# Patient Record
Sex: FEMALE | Employment: FULL TIME | URBAN - METROPOLITAN AREA
[De-identification: names, ages, dates, MRNs, and addresses within clinical notes are randomized per-mention and may not be internally consistent; named-entity substitution may affect disease eponyms.]

---

## 2024-07-08 ENCOUNTER — OFFICE VISIT (OUTPATIENT)
Dept: GYNECOLOGY | Age: 24
End: 2024-07-08
Payer: COMMERCIAL

## 2024-07-08 VITALS
OXYGEN SATURATION: 99 % | WEIGHT: 129 LBS | RESPIRATION RATE: 17 BRPM | BODY MASS INDEX: 20.73 KG/M2 | SYSTOLIC BLOOD PRESSURE: 112 MMHG | HEART RATE: 92 BPM | HEIGHT: 66 IN | DIASTOLIC BLOOD PRESSURE: 76 MMHG

## 2024-07-08 DIAGNOSIS — N94.6 DYSMENORRHEA: ICD-10-CM

## 2024-07-08 DIAGNOSIS — N63.14 MASS OF LOWER INNER QUADRANT OF RIGHT BREAST: ICD-10-CM

## 2024-07-08 DIAGNOSIS — Z01.419 WELL WOMAN EXAM WITH ROUTINE GYNECOLOGICAL EXAM: Primary | ICD-10-CM

## 2024-07-08 PROCEDURE — 99385 PREV VISIT NEW AGE 18-39: CPT | Performed by: OBSTETRICS & GYNECOLOGY

## 2024-07-08 ASSESSMENT — ENCOUNTER SYMPTOMS
RESPIRATORY NEGATIVE: 1
ALLERGIC/IMMUNOLOGIC NEGATIVE: 1
EYES NEGATIVE: 1
GASTROINTESTINAL NEGATIVE: 1

## 2024-07-09 NOTE — PROGRESS NOTES
Subjective   Patient ID: Shiloh Blevins is a 24 y.o. female.    Patient is here for annual. Patient with some pain in her right breast. Patient with some dysmenorrhea.         Review of Systems   Constitutional: Negative.    HENT: Negative.     Eyes: Negative.    Respiratory: Negative.     Cardiovascular: Negative.    Gastrointestinal: Negative.    Endocrine: Negative.    Genitourinary:  Positive for menstrual problem and pelvic pain.   Musculoskeletal: Negative.    Skin: Negative.    Allergic/Immunologic: Negative.    Neurological: Negative.    Hematological: Negative.    Psychiatric/Behavioral: Negative.        Date of Birth 2000  Past Medical History:   Diagnosis Date    Dysmenorrhea     Irregular uterine bleeding      Past Surgical History:   Procedure Laterality Date    SEPTOPLASTY      WISDOM TOOTH EXTRACTION       OB History    Para Term  AB Living   0 0 0 0 0 0   SAB IAB Ectopic Molar Multiple Live Births   0 0 0 0 0 0     Social History     Socioeconomic History    Marital status: Single     Spouse name: Not on file    Number of children: Not on file    Years of education: Not on file    Highest education level: Not on file   Occupational History    Not on file   Tobacco Use    Smoking status: Former     Types: Cigarettes    Smokeless tobacco: Former   Substance and Sexual Activity    Alcohol use: Yes    Drug use: Not on file    Sexual activity: Yes     Partners: Male   Other Topics Concern    Not on file   Social History Narrative    Not on file     Social Determinants of Health     Financial Resource Strain: Not on file   Food Insecurity: Not on file   Transportation Needs: Not on file   Physical Activity: Not on file   Stress: Not on file   Social Connections: Not on file   Intimate Partner Violence: Not on file   Housing Stability: Not on file     No Known Allergies  No outpatient medications have been marked as taking for the 24 encounter (Office Visit) with Nona

## 2024-07-11 LAB
C TRACH DNA CVX QL NAA+PROBE: NEGATIVE
N GONORRHOEA DNA SPEC QL NAA+PROBE: NEGATIVE

## 2024-08-06 ENCOUNTER — HOSPITAL ENCOUNTER (OUTPATIENT)
Dept: ULTRASOUND IMAGING | Age: 24
Discharge: HOME | End: 2024-08-06
Attending: OBSTETRICS & GYNECOLOGY
Payer: COMMERCIAL

## 2024-08-06 DIAGNOSIS — N63.14 MASS OF LOWER INNER QUADRANT OF RIGHT BREAST: ICD-10-CM

## 2024-08-06 DIAGNOSIS — N94.6 DYSMENORRHEA: ICD-10-CM

## 2024-08-06 PROCEDURE — 76642 ULTRASOUND BREAST LIMITED: CPT

## 2024-08-06 PROCEDURE — 76856 US EXAM PELVIC COMPLETE: CPT

## 2024-08-15 ENCOUNTER — TELEPHONE (OUTPATIENT)
Dept: GYNECOLOGY | Age: 24
End: 2024-08-15

## 2024-08-15 DIAGNOSIS — R92.8 ABNORMAL FINDING ON BREAST IMAGING: Primary | ICD-10-CM

## 2024-09-10 ENCOUNTER — TELEPHONE (OUTPATIENT)
Dept: SURGERY | Age: 24
End: 2024-09-10

## 2024-09-13 ENCOUNTER — TELEPHONE (OUTPATIENT)
Dept: SURGERY | Age: 24
End: 2024-09-13

## 2024-09-13 ENCOUNTER — OFFICE VISIT (OUTPATIENT)
Dept: SURGERY | Age: 24
End: 2024-09-13
Payer: COMMERCIAL

## 2024-09-13 VITALS
HEIGHT: 66 IN | WEIGHT: 123 LBS | SYSTOLIC BLOOD PRESSURE: 84 MMHG | BODY MASS INDEX: 19.77 KG/M2 | HEART RATE: 61 BPM | DIASTOLIC BLOOD PRESSURE: 58 MMHG

## 2024-09-13 DIAGNOSIS — N63.41 SUBAREOLAR MASS OF RIGHT BREAST: Primary | ICD-10-CM

## 2024-09-13 PROCEDURE — 99244 OFF/OP CNSLTJ NEW/EST MOD 40: CPT | Performed by: SURGERY

## 2024-10-07 SDOH — HEALTH STABILITY: PHYSICAL HEALTH: ON AVERAGE, HOW MANY MINUTES DO YOU ENGAGE IN EXERCISE AT THIS LEVEL?: 150+ MIN

## 2024-10-07 SDOH — HEALTH STABILITY: PHYSICAL HEALTH: ON AVERAGE, HOW MANY DAYS PER WEEK DO YOU ENGAGE IN MODERATE TO STRENUOUS EXERCISE (LIKE A BRISK WALK)?: 5 DAYS

## 2024-10-10 ENCOUNTER — OFFICE VISIT (OUTPATIENT)
Dept: INTERNAL MEDICINE CLINIC | Age: 24
End: 2024-10-10

## 2024-10-10 VITALS
HEIGHT: 66 IN | SYSTOLIC BLOOD PRESSURE: 118 MMHG | DIASTOLIC BLOOD PRESSURE: 64 MMHG | WEIGHT: 127 LBS | BODY MASS INDEX: 20.41 KG/M2

## 2024-10-10 DIAGNOSIS — Z00.00 ADULT WELLNESS VISIT: Primary | ICD-10-CM

## 2024-10-10 DIAGNOSIS — F32.A ANXIETY AND DEPRESSION: ICD-10-CM

## 2024-10-10 DIAGNOSIS — F41.9 ANXIETY AND DEPRESSION: ICD-10-CM

## 2024-10-10 RX ORDER — SERTRALINE HYDROCHLORIDE 100 MG/1
100 TABLET, FILM COATED ORAL DAILY
Qty: 90 TABLET | Refills: 3 | Status: SHIPPED | OUTPATIENT
Start: 2024-10-10 | End: 2025-10-05

## 2024-10-10 SDOH — ECONOMIC STABILITY: FOOD INSECURITY: WITHIN THE PAST 12 MONTHS, THE FOOD YOU BOUGHT JUST DIDN'T LAST AND YOU DIDN'T HAVE MONEY TO GET MORE.: NEVER TRUE

## 2024-10-10 SDOH — ECONOMIC STABILITY: FOOD INSECURITY: WITHIN THE PAST 12 MONTHS, YOU WORRIED THAT YOUR FOOD WOULD RUN OUT BEFORE YOU GOT MONEY TO BUY MORE.: NEVER TRUE

## 2024-10-10 SDOH — ECONOMIC STABILITY: INCOME INSECURITY: HOW HARD IS IT FOR YOU TO PAY FOR THE VERY BASICS LIKE FOOD, HOUSING, MEDICAL CARE, AND HEATING?: NOT HARD AT ALL

## 2024-10-10 ASSESSMENT — PATIENT HEALTH QUESTIONNAIRE - PHQ9
SUM OF ALL RESPONSES TO PHQ QUESTIONS 1-9: 0
SUM OF ALL RESPONSES TO PHQ9 QUESTIONS 1 & 2: 0
1. LITTLE INTEREST OR PLEASURE IN DOING THINGS: NOT AT ALL
2. FEELING DOWN, DEPRESSED OR HOPELESS: NOT AT ALL
SUM OF ALL RESPONSES TO PHQ QUESTIONS 1-9: 0

## 2024-10-10 NOTE — ASSESSMENT & PLAN NOTE
- discussed healthy diet and exercise  - discussed relevant vaccinations  - routine labs ordered  - declines STD screening  - up to date with dentist appts  - recommend eye doctor appt for checkup  - up to date with PAPs

## 2024-10-10 NOTE — PROGRESS NOTES
10/10/2024    Shiloh Blevins (:  2000) is a 24 y.o. female, here for evaluation of the following medical concerns:    Chief Complaint   Patient presents with    Establish Care        HPI    Patient is here to establish care. No prior PCP.     She has previously been to psychiatry. She was diagnosed with possible ADHD at that time? She never treated this in the past. She feels this is mostly a social issue but it plays in when she feels a little more down. She feels this is manageable.    She tries to eat healthy diet. 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    No SOB, CP, N/V.       Prior to Visit Medications    Medication Sig Taking? Authorizing Provider   sertraline (ZOLOFT) 100 MG tablet Take 1 tablet by mouth daily Yes Monique Brizuela MD        No Known Allergies    Past Medical History:   Diagnosis Date    Dysmenorrhea     Irregular uterine bleeding        Past Surgical History:   Procedure Laterality Date    SEPTOPLASTY      WISDOM TOOTH EXTRACTION         Social History     Socioeconomic History    Marital status: Single     Spouse name: Not on file    Number of children: Not on file    Years of education: Not on file    Highest education level: Not on file   Occupational History    Not on file   Tobacco Use    Smoking status: Former     Types: Cigarettes    Smokeless tobacco: Former   Substance and Sexual Activity    Alcohol use: Yes    Drug use: Not on file    Sexual activity: Yes     Partners: Male   Other Topics Concern    Not on file   Social History Narrative    Not on file     Social Determinants of Health     Financial Resource Strain: Low Risk  (10/10/2024)    Overall Financial Resource Strain (CARDIA)     Difficulty of Paying Living Expenses: Not hard at all   Food Insecurity: No Food Insecurity (10/10/2024)    Hunger Vital Sign     Worried About Running Out of Food in the Last Year: Never true     Ran Out of Food in the Last Year: Never true   Transportation Needs: Unknown

## 2024-10-10 NOTE — ASSESSMENT & PLAN NOTE
This problem is not controlled on current regimen. This was previously managed by telehealth provider. Dose was increased from 50 to 75mg a day ~2 mo ago. Not in therapy.   - recommend CBT  - increase zoloft from 75 to 100 mg daily

## 2025-03-10 ENCOUNTER — HOSPITAL ENCOUNTER (OUTPATIENT)
Dept: ULTRASOUND IMAGING | Age: 25
Discharge: HOME | End: 2025-03-10
Attending: SURGERY
Payer: COMMERCIAL

## 2025-03-10 DIAGNOSIS — N63.41 SUBAREOLAR MASS OF RIGHT BREAST: ICD-10-CM

## 2025-03-10 PROCEDURE — 76642 ULTRASOUND BREAST LIMITED: CPT

## 2025-03-14 ENCOUNTER — OFFICE VISIT (OUTPATIENT)
Dept: SURGERY | Age: 25
End: 2025-03-14
Payer: COMMERCIAL

## 2025-03-14 VITALS
OXYGEN SATURATION: 100 % | RESPIRATION RATE: 18 BRPM | HEIGHT: 66 IN | BODY MASS INDEX: 21.24 KG/M2 | HEART RATE: 85 BPM | WEIGHT: 132.2 LBS

## 2025-03-14 DIAGNOSIS — N63.41 SUBAREOLAR MASS OF RIGHT BREAST: Primary | ICD-10-CM

## 2025-03-14 PROCEDURE — 99214 OFFICE O/P EST MOD 30 MIN: CPT | Performed by: NURSE PRACTITIONER

## 2025-03-14 NOTE — PROGRESS NOTES
[facility]  Surgical Breast Oncology     Primary Care Provider: Monique Brizuela MD    CC: Breast Mass, Right     HPI: Ms. Shiloh Blevins is a 24 y.o. woman who presents today for follow up right breast mass, present for 5 years, fluctuates in size with menstrual cycle, not tender or bothersome, not really changing in size or shape over the 5 years.      She states that she does perform routine self breast evaluations and has not noticed any new abnormalities such as masses, skin changes, color changes,nipple discharge, or changes to the nipple-areolar complex.      Never required a breast biopsy.   No family history of breast or ovarian cancer.  She talked with Dr. Muhammad at her last visit about surgical excision vs surveillance, at that time she opted for surveillance but now thinks she would like it removed so she doesn't have to have a biopsy or further follow ups (fearful of needles).        Right breast U/S 3/10/2025:  Targeted ultrasound of the right breast at 5:00, retroareolar, demonstrates the  oval hypoechoic mass with gently lobulated margins measuring 2.4 x 2.1 x 1.4 cm,  previously 1.9 x 1.8 x 1.3 cm.  IMPRESSION:  Increasing size of retroareolar right breast mass. Given the increasing size it  is mildly suspicious and biopsy is recommended.  ACR BI-RADS Category: 4 (suspicious)  RECOMMENDATIONS: Ultrasound-guided biopsy is recommended. If biopsy is not  performed then a follow-up ultrasound in 6 months is recommended.      Breast History: Abn imaging   History of Previous Breast Biopsy:  Self Breast Exams Completed:  Family History of Breast Cancer: none  Family History of Other Cancers: none  [employer] Decent: none  Bra Size:  A cup  BABS Risk Assessment _17.0____ %     Gyne History:  : 0  Para: 0  Age of Menarche: 11  Age of Menopause: n/a  Age of first live Birth:  n/a  History of Hysterectomy / SERA-BSO: n/a  History of OCP's/HRT: yes, 4 years  Family History or personal

## 2025-03-23 SDOH — ECONOMIC STABILITY: FOOD INSECURITY: WITHIN THE PAST 12 MONTHS, YOU WORRIED THAT YOUR FOOD WOULD RUN OUT BEFORE YOU GOT MONEY TO BUY MORE.: NEVER TRUE

## 2025-03-23 SDOH — ECONOMIC STABILITY: FOOD INSECURITY: WITHIN THE PAST 12 MONTHS, THE FOOD YOU BOUGHT JUST DIDN'T LAST AND YOU DIDN'T HAVE MONEY TO GET MORE.: NEVER TRUE

## 2025-03-23 SDOH — ECONOMIC STABILITY: TRANSPORTATION INSECURITY
IN THE PAST 12 MONTHS, HAS LACK OF TRANSPORTATION KEPT YOU FROM MEETINGS, WORK, OR FROM GETTING THINGS NEEDED FOR DAILY LIVING?: NO

## 2025-03-23 SDOH — ECONOMIC STABILITY: INCOME INSECURITY: IN THE LAST 12 MONTHS, WAS THERE A TIME WHEN YOU WERE NOT ABLE TO PAY THE MORTGAGE OR RENT ON TIME?: NO

## 2025-03-23 SDOH — ECONOMIC STABILITY: TRANSPORTATION INSECURITY
IN THE PAST 12 MONTHS, HAS THE LACK OF TRANSPORTATION KEPT YOU FROM MEDICAL APPOINTMENTS OR FROM GETTING MEDICATIONS?: NO

## 2025-03-23 ASSESSMENT — PATIENT HEALTH QUESTIONNAIRE - PHQ9
5. POOR APPETITE OR OVEREATING: SEVERAL DAYS
8. MOVING OR SPEAKING SO SLOWLY THAT OTHER PEOPLE COULD HAVE NOTICED. OR THE OPPOSITE, BEING SO FIGETY OR RESTLESS THAT YOU HAVE BEEN MOVING AROUND A LOT MORE THAN USUAL: NOT AT ALL
7. TROUBLE CONCENTRATING ON THINGS, SUCH AS READING THE NEWSPAPER OR WATCHING TELEVISION: SEVERAL DAYS
2. FEELING DOWN, DEPRESSED OR HOPELESS: SEVERAL DAYS
9. THOUGHTS THAT YOU WOULD BE BETTER OFF DEAD, OR OF HURTING YOURSELF: NOT AT ALL
3. TROUBLE FALLING OR STAYING ASLEEP: NOT AT ALL
SUM OF ALL RESPONSES TO PHQ QUESTIONS 1-9: 5
SUM OF ALL RESPONSES TO PHQ QUESTIONS 1-9: 5
6. FEELING BAD ABOUT YOURSELF - OR THAT YOU ARE A FAILURE OR HAVE LET YOURSELF OR YOUR FAMILY DOWN: SEVERAL DAYS
SUM OF ALL RESPONSES TO PHQ QUESTIONS 1-9: 5
SUM OF ALL RESPONSES TO PHQ QUESTIONS 1-9: 5
2. FEELING DOWN, DEPRESSED OR HOPELESS: SEVERAL DAYS
5. POOR APPETITE OR OVEREATING: SEVERAL DAYS
4. FEELING TIRED OR HAVING LITTLE ENERGY: SEVERAL DAYS
6. FEELING BAD ABOUT YOURSELF - OR THAT YOU ARE A FAILURE OR HAVE LET YOURSELF OR YOUR FAMILY DOWN: SEVERAL DAYS
SUM OF ALL RESPONSES TO PHQ QUESTIONS 1-9: 5
1. LITTLE INTEREST OR PLEASURE IN DOING THINGS: NOT AT ALL
4. FEELING TIRED OR HAVING LITTLE ENERGY: SEVERAL DAYS
10. IF YOU CHECKED OFF ANY PROBLEMS, HOW DIFFICULT HAVE THESE PROBLEMS MADE IT FOR YOU TO DO YOUR WORK, TAKE CARE OF THINGS AT HOME, OR GET ALONG WITH OTHER PEOPLE: SOMEWHAT DIFFICULT
10. IF YOU CHECKED OFF ANY PROBLEMS, HOW DIFFICULT HAVE THESE PROBLEMS MADE IT FOR YOU TO DO YOUR WORK, TAKE CARE OF THINGS AT HOME, OR GET ALONG WITH OTHER PEOPLE: SOMEWHAT DIFFICULT
1. LITTLE INTEREST OR PLEASURE IN DOING THINGS: NOT AT ALL
3. TROUBLE FALLING OR STAYING ASLEEP: NOT AT ALL
8. MOVING OR SPEAKING SO SLOWLY THAT OTHER PEOPLE COULD HAVE NOTICED. OR THE OPPOSITE - BEING SO FIDGETY OR RESTLESS THAT YOU HAVE BEEN MOVING AROUND A LOT MORE THAN USUAL: NOT AT ALL
9. THOUGHTS THAT YOU WOULD BE BETTER OFF DEAD, OR OF HURTING YOURSELF: NOT AT ALL
7. TROUBLE CONCENTRATING ON THINGS, SUCH AS READING THE NEWSPAPER OR WATCHING TELEVISION: SEVERAL DAYS

## 2025-03-26 ENCOUNTER — OFFICE VISIT (OUTPATIENT)
Dept: INTERNAL MEDICINE CLINIC | Age: 25
End: 2025-03-26
Payer: COMMERCIAL

## 2025-03-26 VITALS
OXYGEN SATURATION: 100 % | SYSTOLIC BLOOD PRESSURE: 96 MMHG | TEMPERATURE: 98.4 F | BODY MASS INDEX: 20.98 KG/M2 | DIASTOLIC BLOOD PRESSURE: 62 MMHG | HEART RATE: 81 BPM | WEIGHT: 130 LBS

## 2025-03-26 DIAGNOSIS — N63.10 MASS OF RIGHT BREAST, UNSPECIFIED QUADRANT: ICD-10-CM

## 2025-03-26 DIAGNOSIS — F41.9 ANXIETY AND DEPRESSION: ICD-10-CM

## 2025-03-26 DIAGNOSIS — F90.9 ATTENTION DEFICIT HYPERACTIVITY DISORDER (ADHD), UNSPECIFIED ADHD TYPE: Primary | ICD-10-CM

## 2025-03-26 DIAGNOSIS — F32.A ANXIETY AND DEPRESSION: ICD-10-CM

## 2025-03-26 PROCEDURE — 99214 OFFICE O/P EST MOD 30 MIN: CPT | Performed by: STUDENT IN AN ORGANIZED HEALTH CARE EDUCATION/TRAINING PROGRAM

## 2025-03-26 PROCEDURE — G2211 COMPLEX E/M VISIT ADD ON: HCPCS | Performed by: STUDENT IN AN ORGANIZED HEALTH CARE EDUCATION/TRAINING PROGRAM

## 2025-03-26 NOTE — PROGRESS NOTES
3/26/2025    Shiloh Blevins (:  2000) is a 24 y.o. female, here for evaluation of the following medical concerns:    Chief Complaint   Patient presents with    ADHD     Hoping to get meds for ADHD diagnosis. Info has been sent through my chart.         HPI    Patient was diagnosed with ADHD in college. She feels that ADHD has impacted her socially a lot. She graduated last Spring and has been applying to jobs but has been struggling with focusing on this task.        Prior to Visit Medications    Medication Sig Taking? Authorizing Provider   Multiple Vitamin (MULTIVITAMIN ADULT PO) Take 1 capsule by mouth daily Yes ProviderRachelle MD   Omega-3 Fatty Acids (OMEGA-3 FISH OIL PO) Take 1 capsule by mouth daily Yes ProviderRachelle MD        No Known Allergies    Past Medical History:   Diagnosis Date    Dysmenorrhea     Irregular uterine bleeding     Mass of right breast 3/26/2025       Past Surgical History:   Procedure Laterality Date    SEPTOPLASTY      WISDOM TOOTH EXTRACTION         Social History     Socioeconomic History    Marital status: Single     Spouse name: Not on file    Number of children: Not on file    Years of education: Not on file    Highest education level: Not on file   Occupational History    Not on file   Tobacco Use    Smoking status: Former     Types: Cigarettes    Smokeless tobacco: Former   Substance and Sexual Activity    Alcohol use: Yes    Drug use: Not on file    Sexual activity: Yes     Partners: Male   Other Topics Concern    Not on file   Social History Narrative    Not on file     Social Drivers of Health     Financial Resource Strain: Low Risk  (10/10/2024)    Overall Financial Resource Strain (CARDIA)     Difficulty of Paying Living Expenses: Not hard at all   Food Insecurity: No Food Insecurity (3/23/2025)    Hunger Vital Sign     Worried About Running Out of Food in the Last Year: Never true     Ran Out of Food in the Last Year: Never true

## 2025-03-26 NOTE — PATIENT INSTRUCTIONS
[facility]  Address: [address] [place] [address] [address], [place] [address]  Phone: [phone]    CBT- cognitive behavioral therapy

## 2025-04-01 ENCOUNTER — OFFICE VISIT (OUTPATIENT)
Dept: INTERNAL MEDICINE CLINIC | Age: 25
End: 2025-04-01
Payer: COMMERCIAL

## 2025-04-01 VITALS
TEMPERATURE: 98.3 F | SYSTOLIC BLOOD PRESSURE: 120 MMHG | HEIGHT: 65 IN | HEART RATE: 98 BPM | DIASTOLIC BLOOD PRESSURE: 76 MMHG | WEIGHT: 125 LBS | BODY MASS INDEX: 20.83 KG/M2 | OXYGEN SATURATION: 98 %

## 2025-04-01 DIAGNOSIS — N63.10 MASS OF RIGHT BREAST, UNSPECIFIED QUADRANT: ICD-10-CM

## 2025-04-01 DIAGNOSIS — Z01.818 PREOP EXAMINATION: Primary | ICD-10-CM

## 2025-04-01 PROBLEM — Z00.00 ADULT WELLNESS VISIT: Status: RESOLVED | Noted: 2024-10-10 | Resolved: 2025-04-01

## 2025-04-01 PROCEDURE — 99213 OFFICE O/P EST LOW 20 MIN: CPT | Performed by: STUDENT IN AN ORGANIZED HEALTH CARE EDUCATION/TRAINING PROGRAM

## 2025-04-01 PROCEDURE — G2211 COMPLEX E/M VISIT ADD ON: HCPCS | Performed by: STUDENT IN AN ORGANIZED HEALTH CARE EDUCATION/TRAINING PROGRAM

## 2025-04-01 RX ORDER — CETIRIZINE HYDROCHLORIDE 10 MG/1
10 TABLET ORAL DAILY
COMMUNITY

## 2025-04-01 ASSESSMENT — ENCOUNTER SYMPTOMS
SHORTNESS OF BREATH: 0
VOMITING: 0
SORE THROAT: 0
NAUSEA: 0

## 2025-04-01 NOTE — PROGRESS NOTES
2025    Shiloh Blevins (:  2000) is a 24 y.o. female, here for preop exam:      Planned Surgical Procedure: RIGHT BREAST EXCISIONAL BIOPSY under MAC     Date of Planned Surgery: 2025     Location of Surgery: [facility] OR      Attending Surgeon: Dr. Grace Muhammad MD      HPI:  Patient is presenting for preop evaluation. She feels well today and denies any recent infectious symptoms or acute complaints. She is physically active in general and exercises regularly. She tolerates exertion well without excessive dyspnea or any chest pressure, light headedness or palpitations.       Exercise tolerance > /= 4 METS (or less if applicable)    Prior Anesthesia Complications: No     History of Abnormal Bleeding:  No               Review of Systems   Constitutional:  Negative for chills and fever.   HENT:  Negative for sore throat.    Respiratory:  Negative for shortness of breath.    Cardiovascular:  Negative for chest pain.   Gastrointestinal:  Negative for nausea and vomiting.       Past Medical History:   Diagnosis Date    Dysmenorrhea     Irregular uterine bleeding     Mass of right breast 3/26/2025       Past Surgical History:   Procedure Laterality Date    SEPTOPLASTY      WISDOM TOOTH EXTRACTION         Prior to Visit Medications    Medication Sig Taking? Authorizing Provider   cetirizine (ZYRTEC) 10 MG tablet Take 1 tablet by mouth daily Yes ProviderRachelle MD   Multiple Vitamin (MULTIVITAMIN ADULT PO) Take 1 capsule by mouth daily Yes Rachelle Day MD   Omega-3 Fatty Acids (OMEGA-3 FISH OIL PO) Take 1 capsule by mouth daily Yes ProviderRachelle MD        Social History     Socioeconomic History    Marital status: Single     Spouse name: Not on file    Number of children: Not on file    Years of education: Not on file    Highest education level: Not on file   Occupational History    Not on file   Tobacco Use    Smoking status: Former     Types: Cigarettes    Smokeless

## 2025-04-01 NOTE — H&P (VIEW-ONLY)
2025    Shiloh Blevins (:  2000) is a 24 y.o. female, here for preop exam:      Planned Surgical Procedure: RIGHT BREAST EXCISIONAL BIOPSY under MAC     Date of Planned Surgery: 2025     Location of Surgery: [facility] OR      Attending Surgeon: Dr. Grace Muhammad MD      HPI:  Patient is presenting for preop evaluation. She feels well today and denies any recent infectious symptoms or acute complaints. She is physically active in general and exercises regularly. She tolerates exertion well without excessive dyspnea or any chest pressure, light headedness or palpitations.       Exercise tolerance > /= 4 METS (or less if applicable)    Prior Anesthesia Complications: No     History of Abnormal Bleeding:  No               Review of Systems   Constitutional:  Negative for chills and fever.   HENT:  Negative for sore throat.    Respiratory:  Negative for shortness of breath.    Cardiovascular:  Negative for chest pain.   Gastrointestinal:  Negative for nausea and vomiting.       Past Medical History:   Diagnosis Date    Dysmenorrhea     Irregular uterine bleeding     Mass of right breast 3/26/2025       Past Surgical History:   Procedure Laterality Date    SEPTOPLASTY      WISDOM TOOTH EXTRACTION         Prior to Visit Medications    Medication Sig Taking? Authorizing Provider   cetirizine (ZYRTEC) 10 MG tablet Take 1 tablet by mouth daily Yes ProviderRachelle MD   Multiple Vitamin (MULTIVITAMIN ADULT PO) Take 1 capsule by mouth daily Yes Rachelle Day MD   Omega-3 Fatty Acids (OMEGA-3 FISH OIL PO) Take 1 capsule by mouth daily Yes ProviderRachelle MD        Social History     Socioeconomic History    Marital status: Single     Spouse name: Not on file    Number of children: Not on file    Years of education: Not on file    Highest education level: Not on file   Occupational History    Not on file   Tobacco Use    Smoking status: Former     Types: Cigarettes    Smokeless  Appearance: Normal appearance. She is normal weight.   HENT:      Head: Normocephalic.      Mouth/Throat:      Mouth: Mucous membranes are moist.   Eyes:      General: No scleral icterus.     Extraocular Movements: Extraocular movements intact.      Conjunctiva/sclera: Conjunctivae normal.   Cardiovascular:      Rate and Rhythm: Normal rate and regular rhythm.      Pulses: Normal pulses.      Heart sounds: Normal heart sounds.   Pulmonary:      Effort: Pulmonary effort is normal.      Breath sounds: Normal breath sounds.   Abdominal:      General: There is no distension.      Palpations: Abdomen is soft.      Tenderness: There is no abdominal tenderness.   Musculoskeletal:         General: No swelling or tenderness.      Cervical back: Neck supple. No tenderness.   Lymphadenopathy:      Cervical: No cervical adenopathy.   Skin:     General: Skin is warm and dry.   Neurological:      Mental Status: She is oriented to person, place, and time. Mental status is at baseline.      Motor: No weakness.   Psychiatric:         Mood and Affect: Mood normal.       Studies: Labs - pending     Assessment of Surgical Risk:  Risk of Planned Surgical Procedure: Low    Active High Risk Cardiac Conditions:  none   Risk Calculation: RCRI Score (0 out of 6) - Class I Risk  Cardiac Risk Assessment: Low   OVERALL risk for surgery: Low      This patient is medically optimized at this point in time for this procedure pending normal labs.    ======================  PRE-OP MEDICATION INSTRUCTIONS:    VITAMINS AND SUPPLEMENTS: Hold for 1 week prior to procedure.     ====================================  Mass of right breast   Has lumpectomy scheduled for April 11

## 2025-04-07 DIAGNOSIS — Z01.818 PREOP EXAMINATION: ICD-10-CM

## 2025-04-07 LAB
APTT BLD: 36.6 SEC (ref 22.1–36.4)
INR PPP: 1.11 (ref 0.85–1.15)
PROTHROMBIN TIME: 14.5 SEC (ref 11.9–14.9)

## 2025-04-08 ENCOUNTER — RESULTS FOLLOW-UP (OUTPATIENT)
Dept: INTERNAL MEDICINE CLINIC | Age: 25
End: 2025-04-08

## 2025-04-08 LAB
ANION GAP SERPL CALCULATED.3IONS-SCNC: 10 MMOL/L (ref 3–16)
BUN SERPL-MCNC: 6 MG/DL (ref 7–20)
CALCIUM SERPL-MCNC: 9.1 MG/DL (ref 8.3–10.6)
CHLORIDE SERPL-SCNC: 106 MMOL/L (ref 99–110)
CO2 SERPL-SCNC: 24 MMOL/L (ref 21–32)
CREAT SERPL-MCNC: 0.6 MG/DL (ref 0.6–1.1)
DEPRECATED RDW RBC AUTO: 12.7 % (ref 12.4–15.4)
GFR SERPLBLD CREATININE-BSD FMLA CKD-EPI: >90 ML/MIN/{1.73_M2}
GLUCOSE SERPL-MCNC: 87 MG/DL (ref 70–99)
HCT VFR BLD AUTO: 40.3 % (ref 36–48)
HGB BLD-MCNC: 13.7 G/DL (ref 12–16)
MCH RBC QN AUTO: 28.9 PG (ref 26–34)
MCHC RBC AUTO-ENTMCNC: 34.1 G/DL (ref 31–36)
MCV RBC AUTO: 84.6 FL (ref 80–100)
PLATELET # BLD AUTO: 310 K/UL (ref 135–450)
PMV BLD AUTO: 9.1 FL (ref 5–10.5)
POTASSIUM SERPL-SCNC: 4.1 MMOL/L (ref 3.5–5.1)
RBC # BLD AUTO: 4.76 M/UL (ref 4–5.2)
SODIUM SERPL-SCNC: 140 MMOL/L (ref 136–145)
WBC # BLD AUTO: 4.9 K/UL (ref 4–11)

## 2025-04-08 RX ORDER — ESCITALOPRAM OXALATE 5 MG/1
5 TABLET ORAL DAILY
COMMUNITY
Start: 2025-04-02

## 2025-04-08 NOTE — PROGRESS NOTES

## 2025-04-10 NOTE — PROGRESS NOTES
4/10/25 @ 1215 I called surgeon office and left message on Jenny voice mail that new antibiotic needed  as pt weight is only 125 lb /NR

## 2025-04-11 ENCOUNTER — APPOINTMENT (OUTPATIENT)
Dept: NUCLEAR MEDICINE | Age: 25
End: 2025-04-11
Attending: SURGERY
Payer: COMMERCIAL

## 2025-04-11 ENCOUNTER — HOSPITAL ENCOUNTER (OUTPATIENT)
Age: 25
Setting detail: OUTPATIENT SURGERY
Discharge: HOME | End: 2025-04-11
Attending: SURGERY | Admitting: SURGERY
Payer: COMMERCIAL

## 2025-04-11 ENCOUNTER — ANESTHESIA EVENT (OUTPATIENT)
Dept: OPERATING ROOM | Age: 25
End: 2025-04-11
Payer: COMMERCIAL

## 2025-04-11 ENCOUNTER — ANESTHESIA (OUTPATIENT)
Dept: OPERATING ROOM | Age: 25
End: 2025-04-11
Payer: COMMERCIAL

## 2025-04-11 VITALS
OXYGEN SATURATION: 100 % | SYSTOLIC BLOOD PRESSURE: 102 MMHG | HEART RATE: 51 BPM | RESPIRATION RATE: 16 BRPM | BODY MASS INDEX: 20.83 KG/M2 | DIASTOLIC BLOOD PRESSURE: 63 MMHG | HEIGHT: 66 IN | WEIGHT: 129.6 LBS | TEMPERATURE: 97.4 F

## 2025-04-11 DIAGNOSIS — N63.10 MASS OF RIGHT BREAST, UNSPECIFIED QUADRANT: ICD-10-CM

## 2025-04-11 DIAGNOSIS — N63.41 SUBAREOLAR MASS OF RIGHT BREAST: Primary | ICD-10-CM

## 2025-04-11 LAB — HCG UR QL: NEGATIVE

## 2025-04-11 PROCEDURE — 88305 TISSUE EXAM BY PATHOLOGIST: CPT

## 2025-04-11 PROCEDURE — 3700000001 HC ADD 15 MINUTES (ANESTHESIA): Performed by: SURGERY

## 2025-04-11 PROCEDURE — 2580000003 HC RX 258: Performed by: ANESTHESIOLOGY

## 2025-04-11 PROCEDURE — 2500000003 HC RX 250 WO HCPCS: Performed by: SURGERY

## 2025-04-11 PROCEDURE — 6360000002 HC RX W HCPCS

## 2025-04-11 PROCEDURE — 3600000004 HC SURGERY LEVEL 4 BASE: Performed by: SURGERY

## 2025-04-11 PROCEDURE — 6370000000 HC RX 637 (ALT 250 FOR IP): Performed by: SURGERY

## 2025-04-11 PROCEDURE — 2580000003 HC RX 258

## 2025-04-11 PROCEDURE — 7100000010 HC PHASE II RECOVERY - FIRST 15 MIN: Performed by: SURGERY

## 2025-04-11 PROCEDURE — 7100000011 HC PHASE II RECOVERY - ADDTL 15 MIN: Performed by: SURGERY

## 2025-04-11 PROCEDURE — 7100000000 HC PACU RECOVERY - FIRST 15 MIN: Performed by: SURGERY

## 2025-04-11 PROCEDURE — 2500000003 HC RX 250 WO HCPCS

## 2025-04-11 PROCEDURE — 2709999900 HC NON-CHARGEABLE SUPPLY: Performed by: SURGERY

## 2025-04-11 PROCEDURE — 3700000000 HC ANESTHESIA ATTENDED CARE: Performed by: SURGERY

## 2025-04-11 PROCEDURE — 6360000002 HC RX W HCPCS: Performed by: SURGERY

## 2025-04-11 PROCEDURE — 3600000014 HC SURGERY LEVEL 4 ADDTL 15MIN: Performed by: SURGERY

## 2025-04-11 PROCEDURE — 84703 CHORIONIC GONADOTROPIN ASSAY: CPT

## 2025-04-11 PROCEDURE — 7100000001 HC PACU RECOVERY - ADDTL 15 MIN: Performed by: SURGERY

## 2025-04-11 RX ORDER — IPRATROPIUM BROMIDE AND ALBUTEROL SULFATE 2.5; .5 MG/3ML; MG/3ML
1 SOLUTION RESPIRATORY (INHALATION)
Status: CANCELLED | OUTPATIENT
Start: 2025-04-11

## 2025-04-11 RX ORDER — FENTANYL CITRATE 50 UG/ML
25 INJECTION, SOLUTION INTRAMUSCULAR; INTRAVENOUS EVERY 5 MIN PRN
Refills: 0 | Status: CANCELLED | OUTPATIENT
Start: 2025-04-11

## 2025-04-11 RX ORDER — LABETALOL HYDROCHLORIDE 5 MG/ML
10 INJECTION, SOLUTION INTRAVENOUS
Status: CANCELLED | OUTPATIENT
Start: 2025-04-11

## 2025-04-11 RX ORDER — HYDROMORPHONE HYDROCHLORIDE 1 MG/ML
0.5 INJECTION, SOLUTION INTRAMUSCULAR; INTRAVENOUS; SUBCUTANEOUS EVERY 5 MIN PRN
Refills: 0 | Status: CANCELLED | OUTPATIENT
Start: 2025-04-11

## 2025-04-11 RX ORDER — PROCHLORPERAZINE EDISYLATE 5 MG/ML
5 INJECTION INTRAMUSCULAR; INTRAVENOUS
Status: CANCELLED | OUTPATIENT
Start: 2025-04-11

## 2025-04-11 RX ORDER — SODIUM CHLORIDE 9 MG/ML
INJECTION, SOLUTION INTRAVENOUS PRN
Status: CANCELLED | OUTPATIENT
Start: 2025-04-11

## 2025-04-11 RX ORDER — SODIUM CHLORIDE, SODIUM LACTATE, POTASSIUM CHLORIDE, CALCIUM CHLORIDE 600; 310; 30; 20 MG/100ML; MG/100ML; MG/100ML; MG/100ML
INJECTION, SOLUTION INTRAVENOUS CONTINUOUS
Status: DISCONTINUED | OUTPATIENT
Start: 2025-04-11 | End: 2025-04-11 | Stop reason: HOSPADM

## 2025-04-11 RX ORDER — SODIUM CHLORIDE 0.9 % (FLUSH) 0.9 %
5-40 SYRINGE (ML) INJECTION PRN
Status: CANCELLED | OUTPATIENT
Start: 2025-04-11

## 2025-04-11 RX ORDER — KETOROLAC TROMETHAMINE 30 MG/ML
INJECTION, SOLUTION INTRAMUSCULAR; INTRAVENOUS
Status: DISCONTINUED | OUTPATIENT
Start: 2025-04-11 | End: 2025-04-11 | Stop reason: SDUPTHER

## 2025-04-11 RX ORDER — MIDAZOLAM HYDROCHLORIDE 1 MG/ML
INJECTION, SOLUTION INTRAMUSCULAR; INTRAVENOUS
Status: DISCONTINUED | OUTPATIENT
Start: 2025-04-11 | End: 2025-04-11 | Stop reason: SDUPTHER

## 2025-04-11 RX ORDER — SODIUM CHLORIDE 0.9 % (FLUSH) 0.9 %
5-40 SYRINGE (ML) INJECTION EVERY 12 HOURS SCHEDULED
Status: CANCELLED | OUTPATIENT
Start: 2025-04-11

## 2025-04-11 RX ORDER — DIPHENHYDRAMINE HYDROCHLORIDE 50 MG/ML
12.5 INJECTION, SOLUTION INTRAMUSCULAR; INTRAVENOUS
Status: CANCELLED | OUTPATIENT
Start: 2025-04-11

## 2025-04-11 RX ORDER — MEPERIDINE HYDROCHLORIDE 25 MG/ML
12.5 INJECTION INTRAMUSCULAR; INTRAVENOUS; SUBCUTANEOUS EVERY 5 MIN PRN
Refills: 0 | Status: CANCELLED | OUTPATIENT
Start: 2025-04-11

## 2025-04-11 RX ORDER — LIDOCAINE HYDROCHLORIDE 20 MG/ML
INJECTION, SOLUTION EPIDURAL; INFILTRATION; INTRACAUDAL; PERINEURAL
Status: DISCONTINUED | OUTPATIENT
Start: 2025-04-11 | End: 2025-04-11 | Stop reason: SDUPTHER

## 2025-04-11 RX ORDER — OXYCODONE HYDROCHLORIDE 5 MG/1
5 TABLET ORAL
Refills: 0 | Status: CANCELLED | OUTPATIENT
Start: 2025-04-11

## 2025-04-11 RX ORDER — PROPOFOL 10 MG/ML
INJECTION, EMULSION INTRAVENOUS
Status: DISCONTINUED | OUTPATIENT
Start: 2025-04-11 | End: 2025-04-11 | Stop reason: SDUPTHER

## 2025-04-11 RX ORDER — OXYCODONE AND ACETAMINOPHEN 5; 325 MG/1; MG/1
1 TABLET ORAL EVERY 6 HOURS PRN
Qty: 5 TABLET | Refills: 0 | Status: SHIPPED | OUTPATIENT
Start: 2025-04-11 | End: 2025-04-14

## 2025-04-11 RX ORDER — FENTANYL CITRATE 50 UG/ML
INJECTION, SOLUTION INTRAMUSCULAR; INTRAVENOUS
Status: DISCONTINUED | OUTPATIENT
Start: 2025-04-11 | End: 2025-04-11 | Stop reason: SDUPTHER

## 2025-04-11 RX ORDER — MAGNESIUM HYDROXIDE 1200 MG/15ML
LIQUID ORAL CONTINUOUS PRN
Status: COMPLETED | OUTPATIENT
Start: 2025-04-11 | End: 2025-04-11

## 2025-04-11 RX ORDER — LORAZEPAM 2 MG/ML
0.5 INJECTION INTRAMUSCULAR
Status: CANCELLED | OUTPATIENT
Start: 2025-04-11

## 2025-04-11 RX ORDER — ACETAMINOPHEN 325 MG/1
650 TABLET ORAL ONCE
Status: COMPLETED | OUTPATIENT
Start: 2025-04-11 | End: 2025-04-11

## 2025-04-11 RX ORDER — NALOXONE HYDROCHLORIDE 0.4 MG/ML
INJECTION, SOLUTION INTRAMUSCULAR; INTRAVENOUS; SUBCUTANEOUS PRN
Status: CANCELLED | OUTPATIENT
Start: 2025-04-11

## 2025-04-11 RX ORDER — ONDANSETRON 2 MG/ML
INJECTION INTRAMUSCULAR; INTRAVENOUS
Status: DISCONTINUED | OUTPATIENT
Start: 2025-04-11 | End: 2025-04-11 | Stop reason: SDUPTHER

## 2025-04-11 RX ORDER — ONDANSETRON 2 MG/ML
4 INJECTION INTRAMUSCULAR; INTRAVENOUS
Status: CANCELLED | OUTPATIENT
Start: 2025-04-11

## 2025-04-11 RX ORDER — BUPIVACAINE HYDROCHLORIDE 5 MG/ML
INJECTION, SOLUTION EPIDURAL; INTRACAUDAL; PERINEURAL PRN
Status: DISCONTINUED | OUTPATIENT
Start: 2025-04-11 | End: 2025-04-11 | Stop reason: ALTCHOICE

## 2025-04-11 RX ADMIN — SODIUM CHLORIDE, SODIUM LACTATE, POTASSIUM CHLORIDE, AND CALCIUM CHLORIDE: .6; .31; .03; .02 INJECTION, SOLUTION INTRAVENOUS at 09:14

## 2025-04-11 RX ADMIN — PROPOFOL 50 MG: 10 INJECTION, EMULSION INTRAVENOUS at 09:42

## 2025-04-11 RX ADMIN — LIDOCAINE HYDROCHLORIDE 80 MG: 20 INJECTION, SOLUTION EPIDURAL; INFILTRATION; INTRACAUDAL; PERINEURAL at 09:41

## 2025-04-11 RX ADMIN — FENTANYL CITRATE 25 MCG: 50 INJECTION, SOLUTION INTRAMUSCULAR; INTRAVENOUS at 09:36

## 2025-04-11 RX ADMIN — WATER 1000 MG: 1 INJECTION INTRAMUSCULAR; INTRAVENOUS; SUBCUTANEOUS at 09:46

## 2025-04-11 RX ADMIN — KETOROLAC TROMETHAMINE 30 MG: 30 INJECTION, SOLUTION INTRAMUSCULAR at 10:07

## 2025-04-11 RX ADMIN — FENTANYL CITRATE 25 MCG: 50 INJECTION, SOLUTION INTRAMUSCULAR; INTRAVENOUS at 09:49

## 2025-04-11 RX ADMIN — ACETAMINOPHEN 650 MG: 325 TABLET ORAL at 08:55

## 2025-04-11 RX ADMIN — ONDANSETRON 4 MG: 2 INJECTION INTRAMUSCULAR; INTRAVENOUS at 09:31

## 2025-04-11 RX ADMIN — MIDAZOLAM HYDROCHLORIDE 2 MG: 1 INJECTION, SOLUTION INTRAMUSCULAR; INTRAVENOUS at 09:31

## 2025-04-11 RX ADMIN — PROPOFOL 100 MCG/KG/MIN: 10 INJECTION, EMULSION INTRAVENOUS at 09:41

## 2025-04-11 RX ADMIN — DEXMEDETOMIDINE HYDROCHLORIDE 4 MCG: 100 INJECTION, SOLUTION INTRAVENOUS at 09:31

## 2025-04-11 ASSESSMENT — PAIN - FUNCTIONAL ASSESSMENT
PAIN_FUNCTIONAL_ASSESSMENT: NONE - DENIES PAIN
PAIN_FUNCTIONAL_ASSESSMENT: 0-10
PAIN_FUNCTIONAL_ASSESSMENT: 0-10

## 2025-04-11 NOTE — ANESTHESIA PRE PROCEDURE
Counseling given: Not Answered      Vital Signs (Current):   Vitals:    04/11/25 0816   BP: (!) 126/90   Pulse: 73   Resp: 14   Temp: 97.9 °F (36.6 °C)   TempSrc: Oral   SpO2: 100%   Weight: 58.8 kg (129 lb 9.6 oz)   Height: 1.676 m (5' 6\")                                              BP Readings from Last 3 Encounters:   04/11/25 (!) 126/90   04/01/25 120/76   03/26/25 96/62       NPO Status:                                                                                 BMI:   Wt Readings from Last 3 Encounters:   04/11/25 58.8 kg (129 lb 9.6 oz)   04/01/25 56.7 kg (125 lb)   03/26/25 59 kg (130 lb)     Body mass index is 20.92 kg/m².    CBC:   Lab Results   Component Value Date/Time    WBC 4.9 04/07/2025 12:09 PM    RBC 4.76 04/07/2025 12:09 PM    HGB 13.7 04/07/2025 12:09 PM    HCT 40.3 04/07/2025 12:09 PM    MCV 84.6 04/07/2025 12:09 PM    RDW 12.7 04/07/2025 12:09 PM     04/07/2025 12:09 PM       CMP:   Lab Results   Component Value Date/Time     04/07/2025 12:09 PM    K 4.1 04/07/2025 12:09 PM     04/07/2025 12:09 PM    CO2 24 04/07/2025 12:09 PM    BUN 6 04/07/2025 12:09 PM    CREATININE 0.6 04/07/2025 12:09 PM    LABGLOM >90 04/07/2025 12:09 PM    GLUCOSE 87 04/07/2025 12:09 PM    CALCIUM 9.1 04/07/2025 12:09 PM       POC Tests: No results for input(s): \"POCGLU\", \"POCNA\", \"POCK\", \"POCCL\", \"POCBUN\", \"POCHEMO\", \"POCHCT\" in the last 72 hours.    Coags:   Lab Results   Component Value Date/Time    PROTIME 14.5 04/07/2025 12:09 PM    INR 1.11 04/07/2025 12:09 PM    APTT 36.6 04/07/2025 12:09 PM       HCG (If Applicable): No results found for: \"PREGTESTUR\", \"PREGSERUM\", \"HCG\", \"HCGQUANT\"     ABGs: No results found for: \"PHART\", \"PO2ART\", \"UHQ4ZAR\", \"DGM9SJQ\", \"BEART\", \"V3XVXDKZ\"     Type & Screen (If Applicable):  No results found for: \"ABORH\", \"LABANTI\"    Drug/Infectious Status (If Applicable):  No results found for: \"HIV\", \"HEPCAB\"    COVID-19 Screening (If Applicable): No results found for:

## 2025-04-11 NOTE — PROGRESS NOTES
Vitals:    04/11/25 1110   BP:    Pulse: 61   Resp: 16   Temp: 97.8 °F (36.6 °C)   SpO2: 100%         Intake/Output Summary (Last 24 hours) at 4/11/2025 1115  Last data filed at 4/11/2025 1019  Gross per 24 hour   Intake 600 ml   Output --   Net 600 ml       Pain assessment:  none  Pain Level: 0    Patient transferred to care of [facility] RN. Taken to [facility] per Luann, pacu transport. Awake, denies pain/nausea. Taking po. Right breast drsg d/I. Support bra on. All belongings w/pt.    4/11/2025 11:15 AM

## 2025-04-11 NOTE — ANESTHESIA POSTPROCEDURE EVALUATION
Department of Anesthesiology  Postprocedure Note    Patient: Shiloh Blevins  MRN: [id number]  YOB: 2000  Date of evaluation: 4/11/2025    Procedure Summary       Date: 04/11/25 Room / Location: [place] [id number] / [facility]    Anesthesia Start: 0935 Anesthesia Stop: 1028    Procedure: RIGHT BREAST EXCISIONAL BIOPSY (Right: Breast) Diagnosis:       Mass of right breast, unspecified quadrant      (Mass of right breast, unspecified quadrant [N63.10])    Surgeons: Grace Muhammad MD Responsible Provider: Edmond Cordero MD    Anesthesia Type: MAC ASA Status: 1            Anesthesia Type: MAC    Jersey Phase I: Jersey Score: 10    Jersey Phase II:      Anesthesia Post Evaluation    Patient location during evaluation: PACU  Patient participation: complete - patient participated  Level of consciousness: awake  Airway patency: patent  Nausea & Vomiting: no nausea and no vomiting  Cardiovascular status: blood pressure returned to baseline and hemodynamically stable  Respiratory status: acceptable  Hydration status: euvolemic  Multimodal analgesia pain management approach  Pain management: adequate    No notable events documented.

## 2025-04-11 NOTE — PROGRESS NOTES
Ambulatory Surgery/Procedure Discharge Note    Vitals:    04/11/25 1118   BP: 102/63   Pulse: 51   Resp: 16   Temp: 97.4 °F (36.3 °C)   SpO2: 100%       In: 600 [I.V.:600]  Out: -     Restroom use offered before discharge.  Yes  Pt is alert oriented and tolerates PO well. Discharge instructions were read at bedside with the BF. Refuse toileting offered. Paper script on chart. Incision with glue is CDI.     Pain assessment:  none  Pain Level: 0        Patient discharged to home/self care. Patient discharged and walk the patient to waiting family/S.O.       4/11/2025 11:50 AM

## 2025-04-11 NOTE — PROGRESS NOTES
-Pt arrived to [facility] for with boyfriend Reece.  -Patient is resting in bed with call light.  -Antibiotic on hold to OR- Ancef 1g on hold to OR  -Pt belongings bag- 1 bag to locked room, Reece to take phone.  -IV patent w/ IV fluids running.    -Labs sent- pregnancy test is negative  -Patient is on period- underwear and pad on   -Patient has multiple piercing's she requested to keep in. Piercing's include: septum, two in her nose, two in the left ear, two nipple piercing (plastic piercing in the right nipple and metal in the left) Will check with Dr. Muhammad about these.   -Patient has a fear of needles.

## 2025-04-11 NOTE — INTERVAL H&P NOTE
Update History & Physical    The patient's History and Physical of April 1, 2025 was reviewed with the patient and I examined the patient. There was no change. The surgical site was confirmed by the patient and me.     Plan: The risks, benefits, expected outcome, and alternative to the recommended procedure have been discussed with the patient. Patient understands and wants to proceed with the procedure.     Electronically signed by Joel Hoffman DO on 4/11/2025 at 8:21 AM

## 2025-04-11 NOTE — OP NOTE
Operative Note      Patient: Shiloh Blevins  YOB: 2000  MRN: [id number]    Date of Procedure: 4/11/2025    Pre-Op Diagnosis Codes:      * Mass of right breast, unspecified quadrant [N63.10]    Post-Op Diagnosis: Same       Procedure(s):  RIGHT BREAST EXCISIONAL BIOPSY    Surgeon(s):  Grace Muhammad MD    Assistant:   Surgical Assistant: Dave Bhagat Alyssa  Resident: Aaliyah Maldonado DO    Anesthesia: Monitor Anesthesia Care    Estimated Blood Loss (mL): Minimal    Complications: None    Specimens:   ID Type Source Tests Collected by Time Destination   A : RIGHT BREAST MASS; SHORT STITCH SUPERIOR LONG STITCH LATERAL Tissue Tissue SURGICAL PATHOLOGY Grace Muhammad MD 4/11/2025 0959        Implants:  * No implants in log *      Drains: * No LDAs found *    Findings:  Infection Present At Time Of Surgery (PATOS) (choose all levels that have infection present):  No infection present  Other Findings: see op note  This procedure was not performed to treat cancer       Detailed Description of Procedure:   Operative Report      Name:  Shiloh Blevins   MRN:  [id number]  Date:  4/11/2025        PREOPERATIVE DIAGNOSIS: right breast mass    POSTOPERATIVE DIAGNOSIS: same    PROCEDURE:right breast excisional biopsy    SURGEON: Jb    ESTIMATED BLOOD LOSS:  Less than 25 mL    ASSISTANT: PATRICIA Maldonado    ANESTHESIA: MAC.    INDICATIONS FOR PROCEDURE: The patient is a 24 y.o. female who had  presented with a palpable right breast mass, and imaging is suggestive of a fibroadenoma. She is here now for excision of the mass. The risks, benefits and alternatives were discussed with the  patient. Questions were answered and she is agreeable to proceed.    Ms. Blevins was met by me in the preoperative area.  The surgical sites were identified. Consent was obtained. The appropriate breast imaging was reviewed.    DESCRIPTION OF OPERATION: The patient was brought to the operating room  and  placed on the OR table in the supine position. She was given IV sedation and the right breast and axillary region were prepped and draped in the usual sterile fashion.  The area was injected with local anesthetic. An incision was made in the lower inner periareolar border on the right breast and carried down through the skin and subcutaneous tissues. The mass was identified, and this was dissected free from the surrounding tissue using cautery and marked with sutures and sent to pathology for permanent section. If pathology shows the margins to be close or involved with tumor, we will need to consider return to the operating room for re-excision of margins. The wound was irrigated and injected with local anesthetic, and  closed with a deep layer of vicryl and skin was re-approximated with 4-0 Monocryl and covered with surgical glue.     Sponge, needle and instrument counts were correct per nursing.   The patient tolerated the procedure well. She was taken to the  recovery room in stable condition.        Electronically signed by FITZ NICOLE MD on 4/11/2025 at 10:13 AM         Electronically signed by FITZ NICOLE MD on 4/11/2025 at 10:12 AM

## 2025-04-11 NOTE — DISCHARGE INSTRUCTIONS
1.  Diet:  Regular diet as tolerated.  2.  Activity:  No lifting greater than 15 pounds for 3-4 days.  Then activity as tolerated per comfort.  3.  Incision care:  May shower over incision glue starting tomorrow.  4.  No lotion to surgical glue.  5.  Please call for follow-up appointment for 2 weeks.   6.  Medications:  Continue your home medications.  Can take Tylenol or ibuprofen or prescription pain medication.  7.  Call for temperature greater than 101 degrees F or for excessive bleeding or pain or swelling or inability to void.  8.  May take over the counter laxative or stool softeners as needed.  9.  No swimming for 2 weeks.  10. Wear bra day and night for 1 week per comfort.    [facility] AMBULATORY PROCEDURE DISCHARGE INSTRUCTIONS    There are potential side effects of anesthesia or sedation you may experience for the first 24 hours.  These side effects include:    Confusion or Memory loss, Dizziness, or Delayed Reaction Times   [x]A responsible person should be with you for the next 24 hours.  Do not operate any vehicles (automobiles, bicycles, motorcycles) or power tools or machinery for 24 hours.  Do not sign any legal documents or make any legal decisions for 24 hours. Do not drink alcohol for 24 hours or while taking narcotic pain medication.      Nausea    [x]Start with light diet and progress to your normal diet as you feel like eating. However, if you experience nausea or repeated episodes of vomiting which persist beyond 12-24 hours, notify your physician.  Once nausea has passed, remember to keep drinking fluids.    Difficulty Passing Urine  [x]Drink extra amounts of fluid today.  Notify your physician if you have not urinated within 8 hours after your procedure or you feel uncomfortable.      Irritated Throat from a Breathing Tube  [x]Drink extra amounts of fluid today.  Lozenges may help.    Muscle Aches  [x]You may experience some generalized body aches as your muscles recover from  operative site   Unrelieved PAIN  Unrelieved NAUSEA  Blood soaked dressing.  (Some oozing may be normal)  Inability to urinate      Numb, pale, blue, cold or tingling extremity      Physician:  Dr. Muhammad    The above instructions were reviewed with patient/significant other.  The following additional patient specific information was reviewed with the patient/significant other:  [x]Procedure/physician specific instructions  []Medication information sheet(S) including potential side effects  []Della’s egress test  []Pain Ball management  []FAQ Catheter associated blood stream infections  [x]FAQ Surgical Site Infections  []Other-    I have read and understand the instructions given to me: ____________________________________________   (Patient/S.O. Signature)            Date/time 4/11/2025 11:07 AM                 If you smoke STOP. We care about your health!

## 2025-04-14 ENCOUNTER — RESULTS FOLLOW-UP (OUTPATIENT)
Dept: SURGERY | Age: 25
End: 2025-04-14

## 2025-04-18 ENCOUNTER — RESULTS FOLLOW-UP (OUTPATIENT)
Dept: GYNECOLOGY | Age: 25
End: 2025-04-18

## 2025-04-18 DIAGNOSIS — N84.0 ENDOMETRIAL POLYP: Primary | ICD-10-CM

## 2025-06-09 ENCOUNTER — HOSPITAL ENCOUNTER (OUTPATIENT)
Dept: ULTRASOUND IMAGING | Age: 25
Discharge: HOME | End: 2025-06-09
Attending: OBSTETRICS & GYNECOLOGY
Payer: COMMERCIAL

## 2025-06-09 DIAGNOSIS — N84.0 ENDOMETRIAL POLYP: ICD-10-CM

## 2025-06-09 PROCEDURE — 76856 US EXAM PELVIC COMPLETE: CPT

## 2025-06-15 ENCOUNTER — RESULTS FOLLOW-UP (OUTPATIENT)
Dept: GYNECOLOGY | Age: 25
End: 2025-06-15

## 2025-07-14 ENCOUNTER — OFFICE VISIT (OUTPATIENT)
Dept: GYNECOLOGY | Age: 25
End: 2025-07-14
Payer: COMMERCIAL

## 2025-07-14 VITALS
SYSTOLIC BLOOD PRESSURE: 112 MMHG | HEART RATE: 66 BPM | HEIGHT: 66 IN | BODY MASS INDEX: 20.92 KG/M2 | DIASTOLIC BLOOD PRESSURE: 72 MMHG | RESPIRATION RATE: 17 BRPM | OXYGEN SATURATION: 98 %

## 2025-07-14 DIAGNOSIS — Z01.419 WELL WOMAN EXAM WITH ROUTINE GYNECOLOGICAL EXAM: Primary | ICD-10-CM

## 2025-07-14 PROCEDURE — 99395 PREV VISIT EST AGE 18-39: CPT | Performed by: OBSTETRICS & GYNECOLOGY

## 2025-07-14 ASSESSMENT — ENCOUNTER SYMPTOMS
GASTROINTESTINAL NEGATIVE: 1
RESPIRATORY NEGATIVE: 1
EYES NEGATIVE: 1
ALLERGIC/IMMUNOLOGIC NEGATIVE: 1

## 2025-07-15 NOTE — PROGRESS NOTES
Subjective   Patient ID: Shiloh Blevins is a 25 y.o. female.    Patient is here for annual.     Gynecologic Exam        Review of Systems   Constitutional: Negative.    HENT: Negative.     Eyes: Negative.    Respiratory: Negative.     Cardiovascular: Negative.    Gastrointestinal: Negative.    Endocrine: Negative.    Genitourinary: Negative.    Musculoskeletal: Negative.    Skin: Negative.    Allergic/Immunologic: Negative.    Neurological: Negative.    Hematological: Negative.    Psychiatric/Behavioral: Negative.       Date of Birth 2000  Past Medical History:   Diagnosis Date    Anxiety     Dysmenorrhea     Irregular uterine bleeding     Mass of right breast 2025     Past Surgical History:   Procedure Laterality Date    BREAST SURGERY Right 2025    RIGHT BREAST EXCISIONAL BIOPSY performed by Grace Muhammad MD at [facility] OR    SEPTOPLASTY  2018    WISDOM TOOTH EXTRACTION  2018     OB History    Para Term  AB Living   0 0 0 0 0 0   SAB IAB Ectopic Molar Multiple Live Births   0 0 0 0 0 0     Social History     Socioeconomic History    Marital status: Single     Spouse name: Not on file    Number of children: Not on file    Years of education: Not on file    Highest education level: Not on file   Occupational History    Not on file   Tobacco Use    Smoking status: Former     Types: Cigarettes    Smokeless tobacco: Former   Vaping Use    Vaping status: Never Used   Substance and Sexual Activity    Alcohol use: Not Currently     Comment: occasionally    Drug use: Yes     Types: Marijuana (Weed)     Comment: 3/09 last used    Sexual activity: Yes     Partners: Male   Other Topics Concern    Not on file   Social History Narrative    Not on file     Social Drivers of Health     Financial Resource Strain: Low Risk  (10/10/2024)    Overall Financial Resource Strain (CARDIA)     Difficulty of Paying Living Expenses: Not hard at all   Food Insecurity: No Food Insecurity (3/23/2025)

## (undated) DEVICE — SUTURE VICRYL + SZ 3-0 L18IN ABSRB UD SH 1/2 CIR TAPERCUT NDL VCP864D

## (undated) DEVICE — LIQUIBAND RAPID ADHESIVE 36/CS 0.8ML: Brand: MEDLINE

## (undated) DEVICE — YANKAUER,OPEN TIP,W/O VENT,STERILE: Brand: MEDLINE INDUSTRIES, INC.

## (undated) DEVICE — TRAP FLUID

## (undated) DEVICE — APPLICATOR MEDICATED 26 CC SOLUTION HI LT ORNG CHLORAPREP

## (undated) DEVICE — SHEET,DRAPE,40X58,STERILE: Brand: MEDLINE

## (undated) DEVICE — MINOR BREAST: Brand: MEDLINE INDUSTRIES, INC.

## (undated) DEVICE — SUTURE VICRYL + SZ 3-0 L27IN ABSRB UD L26MM SH 1/2 CIR VCP416H

## (undated) DEVICE — SUTURE MONOCRYL + SZ 4-0 L27IN ABSRB UD L19MM PS-2 3/8 CIR MCP426H

## (undated) DEVICE — DRAPE,T,LAPARO,TRANS,STERILE: Brand: MEDLINE

## (undated) DEVICE — BLADE ES ELASTOMERIC COAT INSUL DURABLE BEND UPTO 90DEG

## (undated) DEVICE — NEPTUNE E-SEP SMOKE EVACUATION PENCIL, COATED, 70MM BLADE, PUSH BUTTON SWITCH: Brand: NEPTUNE E-SEP

## (undated) DEVICE — GLOVE SURG SZ 7 L12IN FNGR THK79MIL GRN LTX FREE

## (undated) DEVICE — TOWEL,STOP FLAG GOLD N-W: Brand: MEDLINE

## (undated) DEVICE — GOWN,SIRUS,POLYRNF,BRTHSLV,XLN/XL,20/CS: Brand: MEDLINE

## (undated) DEVICE — GLOVE ORANGE PI 7   MSG9070